# Patient Record
Sex: FEMALE | URBAN - METROPOLITAN AREA
[De-identification: names, ages, dates, MRNs, and addresses within clinical notes are randomized per-mention and may not be internally consistent; named-entity substitution may affect disease eponyms.]

---

## 2024-03-29 ENCOUNTER — OV NP (OUTPATIENT)
Dept: URBAN - METROPOLITAN AREA CLINIC 115 | Facility: CLINIC | Age: 19
End: 2024-03-29
Payer: COMMERCIAL

## 2024-03-29 ENCOUNTER — LAB (OUTPATIENT)
Dept: URBAN - METROPOLITAN AREA CLINIC 115 | Facility: CLINIC | Age: 19
End: 2024-03-29

## 2024-03-29 VITALS
WEIGHT: 169.6 LBS | SYSTOLIC BLOOD PRESSURE: 133 MMHG | HEART RATE: 86 BPM | BODY MASS INDEX: 30.05 KG/M2 | HEIGHT: 63 IN | DIASTOLIC BLOOD PRESSURE: 87 MMHG | TEMPERATURE: 98.8 F

## 2024-03-29 DIAGNOSIS — R10.84 GENERALIZED ABDOMINAL PAIN: ICD-10-CM

## 2024-03-29 DIAGNOSIS — R13.19 OTHER DYSPHAGIA: ICD-10-CM

## 2024-03-29 DIAGNOSIS — K59.09 CHRONIC CONSTIPATION: ICD-10-CM

## 2024-03-29 PROBLEM — 40739000: Status: ACTIVE | Noted: 2024-03-29

## 2024-03-29 PROBLEM — 236069009: Status: ACTIVE | Noted: 2024-03-29

## 2024-03-29 PROBLEM — 102614006: Status: ACTIVE | Noted: 2024-03-29

## 2024-03-29 PROCEDURE — 99204 OFFICE O/P NEW MOD 45 MIN: CPT

## 2024-03-29 RX ORDER — PANTOPRAZOLE SODIUM 20 MG/1
1 TABLET TABLET, DELAYED RELEASE ORAL ONCE A DAY
Status: ACTIVE | COMMUNITY

## 2024-03-29 NOTE — PHYSICAL EXAM GASTROINTESTINAL
Abdomen , soft, generalized abdominal pain worst in lower abdomen, nondistended , no guarding or rigidity , no masses palpable

## 2024-03-29 NOTE — HPI-TODAY'S VISIT:
19 y/o Hebrew-speaking F presents for hospital f/u with c/o epigastric abdominal pain x 1 week. Went to [facility] on 3.26.24 for these sx. CBC, CMP, hCG, UA negative. Pain relieved after IV morphine; discharged with pantoprazole 40mg qd. No imaging done. Reports worsening pain with eating (jarad with eating food fast, no particular triggers), starts at LUQ and radiates up to the chest before going to RUQ. States she still has sx with the protonix. Reports nausea and L sided headache, incomplete evacuation (Type 2-3 daily), one time episode of rectal bleeding, bolus sensation. Has tried Miralax for 2 days without much relief. Denies vomiting, diarrhea, odynophagia, unintentional weight loss, burning, change in appetite, early satiety. Denies NSAID use. Denies EtOH/tobacco use. Denies GI dz/CRC in family.

## 2024-05-10 ENCOUNTER — OFFICE VISIT (OUTPATIENT)
Dept: URBAN - METROPOLITAN AREA CLINIC 115 | Facility: CLINIC | Age: 19
End: 2024-05-10

## 2024-05-31 ENCOUNTER — DASHBOARD ENCOUNTERS (OUTPATIENT)
Age: 19
End: 2024-05-31

## 2024-05-31 ENCOUNTER — OFFICE VISIT (OUTPATIENT)
Dept: URBAN - METROPOLITAN AREA CLINIC 115 | Facility: CLINIC | Age: 19
End: 2024-05-31

## 2024-05-31 VITALS
WEIGHT: 169.6 LBS | HEART RATE: 70 BPM | SYSTOLIC BLOOD PRESSURE: 117 MMHG | TEMPERATURE: 97.5 F | RESPIRATION RATE: 16 BRPM | DIASTOLIC BLOOD PRESSURE: 80 MMHG | BODY MASS INDEX: 30.05 KG/M2 | HEIGHT: 63 IN

## 2024-05-31 PROBLEM — 235595009: Status: ACTIVE | Noted: 2024-05-31

## 2024-05-31 RX ORDER — PANTOPRAZOLE SODIUM 20 MG/1
1 TABLET TABLET, DELAYED RELEASE ORAL ONCE A DAY
Status: ACTIVE | COMMUNITY

## 2024-07-17 ENCOUNTER — CLAIMS CREATED FROM THE CLAIM WINDOW (OUTPATIENT)
Dept: URBAN - METROPOLITAN AREA SURGERY CENTER 13 | Facility: SURGERY CENTER | Age: 19
End: 2024-07-17
Payer: COMMERCIAL

## 2024-07-17 ENCOUNTER — CLAIMS CREATED FROM THE CLAIM WINDOW (OUTPATIENT)
Dept: URBAN - METROPOLITAN AREA CLINIC 4 | Facility: CLINIC | Age: 19
End: 2024-07-17
Payer: COMMERCIAL

## 2024-07-17 DIAGNOSIS — K21.9 GASTRO-ESOPHAGEAL REFLUX DISEASE WITHOUT ESOPHAGITIS: ICD-10-CM

## 2024-07-17 DIAGNOSIS — K29.60 OTHER GASTRITIS WITHOUT BLEEDING: ICD-10-CM

## 2024-07-17 DIAGNOSIS — B96.81 HELICOBACTER PYLORI [H. PYLORI] AS THE CAUSE OF DISEASES CLASSIFIED ELSEWHERE: ICD-10-CM

## 2024-07-17 DIAGNOSIS — K29.70 GASTRITIS WITHOUT BLEEDING, UNSPECIFIED CHRONICITY, UNSPECIFIED GASTRITIS TYPE: ICD-10-CM

## 2024-07-17 DIAGNOSIS — R10.13 EPIGASTRIC PAIN: ICD-10-CM

## 2024-07-17 PROCEDURE — 00731 ANES UPR GI NDSC PX NOS: CPT | Performed by: ANESTHESIOLOGIST ASSISTANT

## 2024-07-17 PROCEDURE — 43239 EGD BIOPSY SINGLE/MULTIPLE: CPT | Performed by: INTERNAL MEDICINE

## 2024-07-17 PROCEDURE — 88305 TISSUE EXAM BY PATHOLOGIST: CPT | Performed by: PATHOLOGY

## 2024-07-17 PROCEDURE — 88342 IMHCHEM/IMCYTCHM 1ST ANTB: CPT | Performed by: PATHOLOGY

## 2024-07-17 PROCEDURE — 88341 IMHCHEM/IMCYTCHM EA ADD ANTB: CPT | Performed by: PATHOLOGY

## 2024-07-17 PROCEDURE — 00731 ANES UPR GI NDSC PX NOS: CPT | Performed by: ANESTHESIOLOGY

## 2024-07-17 RX ORDER — PANTOPRAZOLE SODIUM 20 MG/1
1 TABLET TABLET, DELAYED RELEASE ORAL ONCE A DAY
Status: ACTIVE | COMMUNITY

## 2024-07-31 ENCOUNTER — OFFICE VISIT (OUTPATIENT)
Dept: URBAN - METROPOLITAN AREA CLINIC 115 | Facility: CLINIC | Age: 19
End: 2024-07-31

## 2024-08-06 ENCOUNTER — TELEPHONE ENCOUNTER (OUTPATIENT)
Dept: URBAN - METROPOLITAN AREA CLINIC 42 | Facility: CLINIC | Age: 19
End: 2024-08-06

## 2024-08-06 RX ORDER — OMEPRAZOLE 40 MG/1
1 CAPSULE 30 MINUTES BEFORE MORNING MEAL CAPSULE, DELAYED RELEASE ORAL ONCE A DAY
Qty: 14 | Refills: 0 | OUTPATIENT
Start: 2024-08-06

## 2024-08-06 RX ORDER — AMOXICILLIN 500 MG/1
2 CAPSULE CAPSULE ORAL
Qty: 56 | Refills: 0 | OUTPATIENT
Start: 2024-08-06 | End: 2024-08-20

## 2024-08-06 RX ORDER — LEVOFLOXACIN 500 MG/1
1 TABLET TABLET, FILM COATED ORAL ONCE A DAY
Qty: 14 TABLET | Refills: 0 | OUTPATIENT
Start: 2024-08-06 | End: 2024-08-20

## 2024-08-14 ENCOUNTER — OFFICE VISIT (OUTPATIENT)
Dept: URBAN - METROPOLITAN AREA CLINIC 115 | Facility: CLINIC | Age: 19
End: 2024-08-14

## 2024-11-12 ENCOUNTER — TELEPHONE ENCOUNTER (OUTPATIENT)
Dept: URBAN - METROPOLITAN AREA CLINIC 115 | Facility: CLINIC | Age: 19
End: 2024-11-12

## 2024-12-10 ENCOUNTER — OFFICE VISIT (OUTPATIENT)
Dept: URBAN - METROPOLITAN AREA CLINIC 115 | Facility: CLINIC | Age: 19
End: 2024-12-10

## 2025-01-06 ENCOUNTER — OFFICE VISIT (OUTPATIENT)
Dept: URBAN - METROPOLITAN AREA CLINIC 115 | Facility: CLINIC | Age: 20
End: 2025-01-06